# Patient Record
Sex: MALE | Race: BLACK OR AFRICAN AMERICAN | NOT HISPANIC OR LATINO | URBAN - METROPOLITAN AREA
[De-identification: names, ages, dates, MRNs, and addresses within clinical notes are randomized per-mention and may not be internally consistent; named-entity substitution may affect disease eponyms.]

---

## 2020-02-04 ENCOUNTER — EMERGENCY (EMERGENCY)
Facility: HOSPITAL | Age: 55
LOS: 1 days | Discharge: HOME | End: 2020-02-04
Attending: EMERGENCY MEDICINE
Payer: MEDICARE

## 2020-02-04 VITALS
OXYGEN SATURATION: 97 % | RESPIRATION RATE: 18 BRPM | WEIGHT: 169.98 LBS | HEART RATE: 65 BPM | SYSTOLIC BLOOD PRESSURE: 128 MMHG | TEMPERATURE: 98 F | DIASTOLIC BLOOD PRESSURE: 80 MMHG | HEIGHT: 71 IN

## 2020-02-04 VITALS
OXYGEN SATURATION: 98 % | HEART RATE: 55 BPM | SYSTOLIC BLOOD PRESSURE: 141 MMHG | RESPIRATION RATE: 20 BRPM | DIASTOLIC BLOOD PRESSURE: 80 MMHG | TEMPERATURE: 99 F

## 2020-02-04 LAB
ANION GAP SERPL CALC-SCNC: 15 MMOL/L — SIGNIFICANT CHANGE UP (ref 5–17)
APTT BLD: 26.9 SEC — LOW (ref 27.5–36.3)
BASOPHILS # BLD AUTO: 0.04 K/UL — SIGNIFICANT CHANGE UP (ref 0–0.2)
BASOPHILS NFR BLD AUTO: 0.3 % — SIGNIFICANT CHANGE UP (ref 0–2)
BUN SERPL-MCNC: 15 MG/DL — SIGNIFICANT CHANGE UP (ref 8–20)
CALCIUM SERPL-MCNC: 9.5 MG/DL — SIGNIFICANT CHANGE UP (ref 8.6–10.2)
CHLORIDE SERPL-SCNC: 99 MMOL/L — SIGNIFICANT CHANGE UP (ref 98–107)
CK MB CFR SERPL CALC: 2.1 NG/ML — SIGNIFICANT CHANGE UP (ref 0–6.7)
CK SERPL-CCNC: 188 U/L — SIGNIFICANT CHANGE UP (ref 30–200)
CO2 SERPL-SCNC: 22 MMOL/L — SIGNIFICANT CHANGE UP (ref 22–29)
CREAT SERPL-MCNC: 1.11 MG/DL — SIGNIFICANT CHANGE UP (ref 0.5–1.3)
EOSINOPHIL # BLD AUTO: 0.19 K/UL — SIGNIFICANT CHANGE UP (ref 0–0.5)
EOSINOPHIL NFR BLD AUTO: 1.6 % — SIGNIFICANT CHANGE UP (ref 0–6)
GLUCOSE SERPL-MCNC: 101 MG/DL — HIGH (ref 70–99)
HCT VFR BLD CALC: 42.7 % — SIGNIFICANT CHANGE UP (ref 39–50)
HGB BLD-MCNC: 13.6 G/DL — SIGNIFICANT CHANGE UP (ref 13–17)
IMM GRANULOCYTES NFR BLD AUTO: 0.2 % — SIGNIFICANT CHANGE UP (ref 0–1.5)
INR BLD: 1.08 RATIO — SIGNIFICANT CHANGE UP (ref 0.88–1.16)
LYMPHOCYTES # BLD AUTO: 1.63 K/UL — SIGNIFICANT CHANGE UP (ref 1–3.3)
LYMPHOCYTES # BLD AUTO: 13.9 % — SIGNIFICANT CHANGE UP (ref 13–44)
MCHC RBC-ENTMCNC: 29.4 PG — SIGNIFICANT CHANGE UP (ref 27–34)
MCHC RBC-ENTMCNC: 31.9 GM/DL — LOW (ref 32–36)
MCV RBC AUTO: 92.2 FL — SIGNIFICANT CHANGE UP (ref 80–100)
MONOCYTES # BLD AUTO: 0.65 K/UL — SIGNIFICANT CHANGE UP (ref 0–0.9)
MONOCYTES NFR BLD AUTO: 5.5 % — SIGNIFICANT CHANGE UP (ref 2–14)
NEUTROPHILS # BLD AUTO: 9.22 K/UL — HIGH (ref 1.8–7.4)
NEUTROPHILS NFR BLD AUTO: 78.5 % — HIGH (ref 43–77)
PLATELET # BLD AUTO: 219 K/UL — SIGNIFICANT CHANGE UP (ref 150–400)
POTASSIUM SERPL-MCNC: 4 MMOL/L — SIGNIFICANT CHANGE UP (ref 3.5–5.3)
POTASSIUM SERPL-SCNC: 4 MMOL/L — SIGNIFICANT CHANGE UP (ref 3.5–5.3)
PROTHROM AB SERPL-ACNC: 12.5 SEC — SIGNIFICANT CHANGE UP (ref 10–12.9)
RBC # BLD: 4.63 M/UL — SIGNIFICANT CHANGE UP (ref 4.2–5.8)
RBC # FLD: 13.7 % — SIGNIFICANT CHANGE UP (ref 10.3–14.5)
SODIUM SERPL-SCNC: 136 MMOL/L — SIGNIFICANT CHANGE UP (ref 135–145)
TROPONIN T SERPL-MCNC: <0.01 NG/ML — SIGNIFICANT CHANGE UP (ref 0–0.06)
WBC # BLD: 11.75 K/UL — HIGH (ref 3.8–10.5)
WBC # FLD AUTO: 11.75 K/UL — HIGH (ref 3.8–10.5)

## 2020-02-04 PROCEDURE — 82553 CREATINE MB FRACTION: CPT

## 2020-02-04 PROCEDURE — 71045 X-RAY EXAM CHEST 1 VIEW: CPT

## 2020-02-04 PROCEDURE — 96360 HYDRATION IV INFUSION INIT: CPT | Mod: XU

## 2020-02-04 PROCEDURE — 75574 CT ANGIO HRT W/3D IMAGE: CPT

## 2020-02-04 PROCEDURE — 85730 THROMBOPLASTIN TIME PARTIAL: CPT

## 2020-02-04 PROCEDURE — 80048 BASIC METABOLIC PNL TOTAL CA: CPT

## 2020-02-04 PROCEDURE — 96361 HYDRATE IV INFUSION ADD-ON: CPT

## 2020-02-04 PROCEDURE — 93010 ELECTROCARDIOGRAM REPORT: CPT | Mod: 76

## 2020-02-04 PROCEDURE — 93005 ELECTROCARDIOGRAM TRACING: CPT

## 2020-02-04 PROCEDURE — 99284 EMERGENCY DEPT VISIT MOD MDM: CPT

## 2020-02-04 PROCEDURE — 82550 ASSAY OF CK (CPK): CPT

## 2020-02-04 PROCEDURE — 93306 TTE W/DOPPLER COMPLETE: CPT

## 2020-02-04 PROCEDURE — 85610 PROTHROMBIN TIME: CPT

## 2020-02-04 PROCEDURE — G0378: CPT

## 2020-02-04 PROCEDURE — 84484 ASSAY OF TROPONIN QUANT: CPT

## 2020-02-04 PROCEDURE — 99284 EMERGENCY DEPT VISIT MOD MDM: CPT | Mod: 25

## 2020-02-04 PROCEDURE — 36415 COLL VENOUS BLD VENIPUNCTURE: CPT

## 2020-02-04 PROCEDURE — 99218: CPT

## 2020-02-04 PROCEDURE — 75574 CT ANGIO HRT W/3D IMAGE: CPT | Mod: 26

## 2020-02-04 PROCEDURE — 71045 X-RAY EXAM CHEST 1 VIEW: CPT | Mod: 26

## 2020-02-04 PROCEDURE — 93306 TTE W/DOPPLER COMPLETE: CPT | Mod: 26

## 2020-02-04 PROCEDURE — 85027 COMPLETE CBC AUTOMATED: CPT

## 2020-02-04 RX ORDER — SODIUM CHLORIDE 9 MG/ML
1000 INJECTION INTRAMUSCULAR; INTRAVENOUS; SUBCUTANEOUS ONCE
Refills: 0 | Status: COMPLETED | OUTPATIENT
Start: 2020-02-04 | End: 2020-02-04

## 2020-02-04 RX ADMIN — SODIUM CHLORIDE 1000 MILLILITER(S): 9 INJECTION INTRAMUSCULAR; INTRAVENOUS; SUBCUTANEOUS at 05:37

## 2020-02-04 RX ADMIN — SODIUM CHLORIDE 1000 MILLILITER(S): 9 INJECTION INTRAMUSCULAR; INTRAVENOUS; SUBCUTANEOUS at 07:30
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
## 2020-02-04 NOTE — CONSULT NOTE ADULT - ATTENDING COMMENTS
Patient seen and examined by me.  I have discussed my recommendation with the PA which are outlined above.  Patients mother at bedside.  Coronary CTA is normal. Echo results pending.  May d/c if echo is normal.  Patient was given my business card for him to set up a f/u appointment.  Patient needs outpatient [facility]  Will sign off

## 2020-02-04 NOTE — ED ADULT TRIAGE NOTE - CHIEF COMPLAINT QUOTE
Pt BIBA from home, multiple syncopal episodes as per EMS. Pt states "I passed out a couple times, admits to hitting head, I was on the floor of the bathroom for about 3 hours because I couldn't get up, my left leg felt weird, and I passed out again, I haven't been eating much." Pt A+Ox4, moving all extremities x4, denies any pain or discomfort.

## 2020-02-04 NOTE — ED CDU PROVIDER DISPOSITION NOTE - NSFOLLOWUPCLINICS_GEN_ALL_ED_FT
[facility]  Cardiology  [address] [place] [address]  Phone: [phone]  Fax:   Follow Up Time:

## 2020-02-04 NOTE — ED ADULT NURSE NOTE - OBJECTIVE STATEMENT
pt presents to ed a&ox3, no acute distress, breaths even and unlabored. c/o 2 episodes of syncope tonight after drinking a few beers. pt reports falling after syncopal episode but did not hit head. pt reports that after episodes he felt numbness to b/l LE. at this time, pt feels pins and needles to b/l LE but no numbness, reports they feel better. pt denies dizziness or headache at this time. pt reports that he hadn't eaten for a few hours tonight and drinks coffee everyday and did not drink any today.

## 2020-02-04 NOTE — ED ADULT NURSE NOTE - INTERVENTIONS DEFINITIONS
Call bell, personal items and telephone within reach/Non-slip footwear when patient is off stretcher/Stretcher in lowest position, wheels locked, appropriate side rails in place/[place] to call system

## 2020-02-04 NOTE — ED CDU PROVIDER DISPOSITION NOTE - PATIENT PORTAL LINK FT
You can access the FollowMyHealth Patient Portal offered by [facility] by registering at the following website: [email or web address]. By joining [facility]’s FollowMyHealth portal, you will also be able to view your health information using other applications (apps) compatible with our system.

## 2020-02-04 NOTE — ED PROVIDER NOTE - CPE EDP HEME LYMPH NORM
normal... Unna Boot Text: An Unna boot was placed to help immobilize the limb and facilitate more rapid healing.
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
## 2020-02-04 NOTE — ED CDU PROVIDER DISPOSITION NOTE - CLINICAL COURSE
54 year old male presents for an episode of syncope. Pt seen by cardiology who reccomended Echo and CT cardiac. CT cardiac and Echo both normal. Cardio to arrange for out pt MCOT monitor. 3 neg trops, remaining labs nl. Pt feeling well at this time. Will dc with Cardiology as out pt f/u.

## 2022-03-08 NOTE — ED ADULT NURSE REASSESSMENT NOTE - NS ED NURSE REASSESS COMMENT FT1
Patient requesting a call back. States medication has still not been received by pharmacy.  Please advise
Rn called Rx  For pimecrolimus 1% cream to pt pharmacy and informed patient.
pt back from [place] denies any pain at the moment. plan of care explained to pt, pt verbalized understanding, pt ambulated to the bathroom without assistance denies N/V. dizziness.. MD Laureano at bedside.
pt AOX3 in no apparent distress, denies any pain at the moment. IV patent and flushing without difficulty, no sings of infiltration.

## 2022-07-06 ENCOUNTER — EMERGENCY (EMERGENCY)
Facility: HOSPITAL | Age: 57
LOS: 1 days | Discharge: LEFT BEFORE DISCHARGE | End: 2022-07-06

## 2022-07-06 PROBLEM — H40.9 UNSPECIFIED GLAUCOMA: Chronic | Status: ACTIVE | Noted: 2020-02-04

## 2022-07-06 PROCEDURE — L9991: CPT

## 2024-04-18 NOTE — ED CDU PROVIDER INITIAL DAY NOTE - HEME LYMPH, MLM
Interval Events: Patient seen and examined at bedside. Denies new medical complaints. States Used BIPAP overnight, AM ABG compensated. Taken off BIPAP and placed on 4 L NC with SpO2 96 %.     REVIEW OF SYSTEMS:  All negative unless listed within interval HPI         OBJECTIVE:   Vital Signs Last 24 Hrs  T(C): 36.7 (18 Apr 2024 10:41), Max: 36.7 (17 Apr 2024 16:08)  T(F): 98.1 (18 Apr 2024 10:41), Max: 98.1 (18 Apr 2024 10:41)  HR: 96 (18 Apr 2024 10:41) (69 - 99)  BP: 105/66 (18 Apr 2024 10:41) (103/69 - 118/83)  BP(mean): --  ABP: --  ABP(mean): --  RR: 18 (18 Apr 2024 10:41) (18 - 18)  SpO2: 94% (18 Apr 2024 10:41) (91% - 99%)    O2 Parameters below as of 18 Apr 2024 10:41  Patient On (Oxygen Delivery Method): nasal cannula  O2 Flow (L/min): 4            04-17 @ 07:01  -  04-18 @ 07:00  --------------------------------------------------------  IN: 600 mL / OUT: 1000 mL / NET: -400 mL    04-18 @ 07:01  -  04-18 @ 11:16  --------------------------------------------------------  IN: 240 mL / OUT: 0 mL / NET: 240 mL      CAPILLARY BLOOD GLUCOSE      POCT Blood Glucose.: 141 mg/dL (17 Apr 2024 13:45)      PHYSICAL EXAM:  GENERAL: NAD, well-groomed, well-developed  HEAD:  Atraumatic, Normocephalic  EYES:  PERRLA, conjunctiva and sclera clear  ENMT: Moist mucous membranes  NECK: Supple, No JVD, Trachea Midline  NERVOUS SYSTEM:  Alert & Oriented X3, Good concentration; Motor Strength 5/5 B/L upper and lower extremities; DTRs 2+ intact and symmetric  CHEST/LUNG: Clear to percussion bilaterally; No rales, rhonchi, wheezing, or rubs  HEART: Regular rate and rhythm; No murmurs, rubs, or gallops  ABDOMEN: Soft, Nontender, Nondistended; Bowel sounds present  EXTREMITIES:  2+ Peripheral Pulses, No clubbing, cyanosis, or edema  LYMPH: No lymphadenopathy noted  SKIN: No rashes or lesions    HOSPITAL MEDICATIONS:  rivaroxaban 20 milliGRAM(s) Oral with dinner      carvedilol 12.5 milliGRAM(s) Oral every 12 hours  enalapril 20 milliGRAM(s) Oral daily  furosemide    Tablet 40 milliGRAM(s) Oral daily        acetaminophen     Tablet .. 650 milliGRAM(s) Oral every 6 hours PRN    lactulose Syrup 10 Gram(s) Oral three times a day  pantoprazole    Tablet 40 milliGRAM(s) Oral before breakfast  senna 2 Tablet(s) Oral at bedtime        ascorbic acid 500 milliGRAM(s) Oral daily      AQUAPHOR (petrolatum Ointment) 1 Application(s) Topical at bedtime        LABS:                        11.7   8.07  )-----------( 231      ( 17 Apr 2024 14:25 )             39.2     Hgb Trend: 11.7<--, 13.1<--, 13.5<--  04-17    137  |  87<L>  |  22  ----------------------------<  138<H>  3.8   |  >45<HH>  |  0.76    Ca    8.9      17 Apr 2024 14:25  Phos  2.5     04-17  Mg     1.8     04-17    TPro  7.0  /  Alb  2.8<L>  /  TBili  0.3  /  DBili  x   /  AST  23  /  ALT  16  /  AlkPhos  73  04-17    Creatinine Trend: 0.76<--, 0.63<--, 0.76<--, 0.89<--, 0.80<--, 0.44<--    Urinalysis Basic - ( 17 Apr 2024 14:25 )    Color: x / Appearance: x / SG: x / pH: x  Gluc: 138 mg/dL / Ketone: x  / Bili: x / Urobili: x   Blood: x / Protein: x / Nitrite: x   Leuk Esterase: x / RBC: x / WBC x   Sq Epi: x / Non Sq Epi: x / Bacteria: x      Arterial Blood Gas:  04-18 @ 00:06  7.44/78/84/53/97.8/23.7  ABG lactate: --  Arterial Blood Gas:  04-17 @ 21:13  7.35/99/82/55/98.6/23.1  ABG lactate: --  Arterial Blood Gas:  04-17 @ 14:33  7.30/111/76/55/95.9/22.8  ABG lactate: --  Arterial Blood Gas:  04-17 @ 06:19  7.45/75/143/52/99.3/23.2  ABG lactate: --    Venous Blood Gas:  04-17 @ 08:36  7.56/57/225/51/99.2  VBG Lactate: 1.10      MICROBIOLOGY:         < end of copied text >      RADIOLOGY:  [x ] Reviewed     < from: TTE Echo Complete w/o Contrast w/ Doppler (04.16.24 @ 13:40) >  Summary:   1. Technically difficult and limited study.   2. Endocardial visualization was enhanced with intravenous echo contrast.   3. Normal global left ventricular systolic function.   4. Left ventricular ejection fraction, by visual estimation, is 55 to   60%.   5. Structurally normal mitral valve, with normal leaflet excursion.   6. Normal trileaflet aortic valve with normal opening.   7. Structurally normal tricuspid valve, with normal leaflet excursion.   8. Structurally normal pulmonic valve, with normal leaflet excursion.      [id number] Nathanael Ruiz MD, FACC, RPVI  Electronically signed on 4/17/2024 at 8:54:10 AM No adenopathy or splenomegaly. No cervical or inguinal lymphadenopathy.